# Patient Record
Sex: FEMALE | Race: WHITE
[De-identification: names, ages, dates, MRNs, and addresses within clinical notes are randomized per-mention and may not be internally consistent; named-entity substitution may affect disease eponyms.]

---

## 2017-01-17 ENCOUNTER — HOSPITAL ENCOUNTER (OUTPATIENT)
Dept: HOSPITAL 80 - FIMAGING | Age: 62
End: 2017-01-17
Attending: INTERNAL MEDICINE
Payer: COMMERCIAL

## 2017-01-17 DIAGNOSIS — M81.0: Primary | ICD-10-CM

## 2017-01-18 NOTE — DX
DEXA Bone Mineral Densitometry    



Clinical Indications:  Osteoporosis.



Technique:  Bone Mineral Densitometry (BMD) by Dual Energy X-Ray Absorptiometry (DEXA) was performed 
utilizing the [employer] scanner.  The lumbar spine was evaluated in the AP projection.  The bilat
eral hips and  forearm were evaluated in the AP projection.  Vertebral fracture assessment was also p
erformed.



Comparison:  October 29, 2014.



AP Lumbar Spine:  The L1, L2, L3, and L4 vertebral bodies were evaluated.  

BMD:  0.794 gm/cm2.

T-score:  -3.1 SD. 

Z-score:  -1.6 SD.

-5.6% change.



AP Left Hip:  Total

BMD:  0.695 gm/cm2.

T-score:  -2.5 SD. 

Z-score:  -1.3 SD.

-1.7% change.



AP Right Hip:  Total

BMD:  0.640 gm/cm2.

T-score:  -2.9 SD. 

Z-score:  -1.7 SD.

-5.0% decrease.

 

AP Left Forearm, 1/3:  

BMD:  0.660 gm/cm2.

T-score:  -2.5 SD. 

Z-score:  -1.6 SD.

-6.5% decrease.



Vertebral Fracture Assessment:   No significant fracture deformity.  No prevertebral aortic calcifica
tion, significant marginal bone spurring, facet arthrosis, or intrinsic vertebral body sclerosis that
 would effect the accuracy of the lumbar spine BMD measurement.



Conclusion:  Considering the lowest measured site, the patient is osteoporotic.  Worsening bone 
al density compared to prior examination

The ten year FRAX risk for any major osteoporotic fracture, which excludes the risk for a wrist fract
ure, is 23.7 % and for a hip fracture is 8.1 %.

Any bone loss in this patient is probably related to aging or estrogen deficiency.



Recommendations  

1. Consider excluding secondary metabolic causes of bone loss (reported to be present in as many as 3
0% of patients with normal Z-scores).  

2.  Laboratory evaluation might include CBC, TSH, calcium, phosphorous, albumin, creatinine, alkaline
 phosphatase, PTH, serum electrophoresis (SPEP or UPEP), and antitissue transglutaminase antibody lev
els (celiac disease) and hydroxy vitamin D3, as well as a 24-hour urine calcium.  If secondary causes
 are excluded, then consider initiating an antiresorptive agent such as bisphosphonate, nasal spray c
alcitonin or raloxifene or an anabolic agent such as teriparatide.  

3.  Calcium intake should be at least 1500 per day and vitamin D intake at least 800 international un
its per day.  

4.  The patient should be encouraged to participate in a regular exercise program that includes weigh
tbearing and muscle strengthening regimens.  5.  Recommend follow-up DEXA in one year to assess the e
fficacy of pharmacologic intervention or correction of appropriate secondary cause.

## 2017-11-30 ENCOUNTER — HOSPITAL ENCOUNTER (OUTPATIENT)
Dept: HOSPITAL 80 - BMCIMAGING | Age: 62
End: 2017-11-30
Attending: INTERNAL MEDICINE
Payer: COMMERCIAL

## 2017-11-30 DIAGNOSIS — Z12.31: Primary | ICD-10-CM

## 2017-11-30 PROCEDURE — G0202 SCR MAMMO BI INCL CAD: HCPCS

## 2018-12-04 ENCOUNTER — HOSPITAL ENCOUNTER (OUTPATIENT)
Dept: HOSPITAL 80 - BMCIMAGING | Age: 63
End: 2018-12-04
Attending: INTERNAL MEDICINE
Payer: COMMERCIAL

## 2018-12-04 DIAGNOSIS — Z12.31: Primary | ICD-10-CM
